# Patient Record
Sex: FEMALE | Race: BLACK OR AFRICAN AMERICAN | NOT HISPANIC OR LATINO | Employment: UNEMPLOYED | URBAN - METROPOLITAN AREA
[De-identification: names, ages, dates, MRNs, and addresses within clinical notes are randomized per-mention and may not be internally consistent; named-entity substitution may affect disease eponyms.]

---

## 2019-05-23 PROBLEM — R20.0 NUMBNESS AND TINGLING: Status: ACTIVE | Noted: 2019-05-23

## 2019-05-23 PROBLEM — R20.2 NUMBNESS AND TINGLING: Status: ACTIVE | Noted: 2019-05-23

## 2019-05-23 PROBLEM — M54.50 LOW BACK PAIN RADIATING TO RIGHT LEG: Status: ACTIVE | Noted: 2019-05-23

## 2019-05-23 PROBLEM — M79.604 LOW BACK PAIN RADIATING TO RIGHT LEG: Status: ACTIVE | Noted: 2019-05-23

## 2019-05-23 PROBLEM — M48.062 LUMBAR STENOSIS WITH NEUROGENIC CLAUDICATION: Status: ACTIVE | Noted: 2019-05-23

## 2019-05-23 PROBLEM — M51.36 DEGENERATIVE DISC DISEASE, LUMBAR: Status: ACTIVE | Noted: 2019-05-23

## 2019-05-23 PROBLEM — M51.369 DEGENERATIVE DISC DISEASE, LUMBAR: Status: ACTIVE | Noted: 2019-05-23

## 2020-01-22 PROBLEM — E78.5 HLD (HYPERLIPIDEMIA): Status: ACTIVE | Noted: 2020-01-22

## 2020-01-22 PROBLEM — K21.9 GASTROESOPHAGEAL REFLUX DISEASE WITHOUT ESOPHAGITIS: Status: ACTIVE | Noted: 2020-01-22

## 2020-01-22 PROBLEM — E11.9 TYPE 2 DIABETES MELLITUS, WITHOUT LONG-TERM CURRENT USE OF INSULIN: Status: ACTIVE | Noted: 2020-01-22

## 2020-01-22 PROBLEM — I10 ESSENTIAL HYPERTENSION: Status: ACTIVE | Noted: 2020-01-22

## 2020-11-17 PROBLEM — R10.9 ABDOMINAL PAIN: Status: ACTIVE | Noted: 2020-11-17

## 2020-12-26 PROBLEM — Z90.710 HISTORY OF HYSTERECTOMY: Status: ACTIVE | Noted: 2020-12-26

## 2020-12-26 PROBLEM — D75.839 THROMBOCYTOSIS: Status: ACTIVE | Noted: 2020-12-26

## 2021-02-22 PROBLEM — S82.841A CLOSED BIMALLEOLAR FRACTURE OF RIGHT ANKLE: Status: ACTIVE | Noted: 2021-02-22

## 2021-02-22 PROBLEM — M25.571 ACUTE RIGHT ANKLE PAIN: Status: ACTIVE | Noted: 2021-02-22
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
## 2021-02-22 PROBLEM — S90.529A: Status: ACTIVE | Noted: 2021-02-22

## 2021-03-03 PROBLEM — Z98.890 STATUS POST SURGERY: Status: ACTIVE | Noted: 2021-03-03

## 2021-03-03 PROBLEM — Z01.818 PRE-OP TESTING: Status: RESOLVED | Noted: 2021-03-03 | Resolved: 2021-03-03
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
## 2021-03-03 PROBLEM — R10.9 ABDOMINAL PAIN: Status: RESOLVED | Noted: 2020-11-17 | Resolved: 2021-03-03

## 2021-03-03 PROBLEM — M54.50 LOW BACK PAIN RADIATING TO RIGHT LEG: Chronic | Status: ACTIVE | Noted: 2019-05-23

## 2021-03-03 PROBLEM — Z90.710 HISTORY OF HYSTERECTOMY: Status: RESOLVED | Noted: 2020-12-26 | Resolved: 2021-03-03

## 2021-03-03 PROBLEM — Z01.818 PRE-OP TESTING: Status: ACTIVE | Noted: 2021-03-03

## 2021-03-03 PROBLEM — M79.604 LOW BACK PAIN RADIATING TO RIGHT LEG: Chronic | Status: ACTIVE | Noted: 2019-05-23

## 2021-05-12 ENCOUNTER — PATIENT MESSAGE (OUTPATIENT)
Dept: RESEARCH | Facility: HOSPITAL | Age: 56
End: 2021-05-12

## 2021-09-21 ENCOUNTER — PATIENT OUTREACH (OUTPATIENT)
Dept: ADMINISTRATIVE | Facility: HOSPITAL | Age: 56
End: 2021-09-21

## 2021-12-21 ENCOUNTER — PATIENT OUTREACH (OUTPATIENT)
Dept: ADMINISTRATIVE | Facility: HOSPITAL | Age: 56
End: 2021-12-21
Payer: MEDICAID

## 2022-08-15 PROBLEM — D75.1 POLYCYTHEMIA: Status: ACTIVE | Noted: 2022-08-15

## 2022-09-01 ENCOUNTER — PATIENT OUTREACH (OUTPATIENT)
Dept: EMERGENCY MEDICINE | Facility: OTHER | Age: 57
End: 2022-09-01
Payer: MEDICAID

## 2022-09-01 NOTE — PROGRESS NOTES
Spoke to patient today after recent ER visit due to Sciatica and she stated she was able to get her medications, but is still in a lot of pain. Patient's F/U with PCP is not until November to discuss with MD. I attempted rescheduling in the system, but cannot schedule for MD in [facility]. Sent patient contact info to get rescheduled for a sooner appointment, or to be added to the waitlist in the event someone cancels and she can get a sooner appointment. Instructed pt to call me if she has any issues at all rescheduling and she verbalized understanding. Pt. Stated she did receive resources sent via email last time we spoke. No additional needs at this time.

## 2022-09-08 DIAGNOSIS — U07.1 COVID-19 VIRUS DETECTED: ICD-10-CM

## 2022-09-10 ENCOUNTER — NURSE TRIAGE (OUTPATIENT)
Dept: ADMINISTRATIVE | Facility: CLINIC | Age: 57
End: 2022-09-10
Payer: MEDICAID

## 2022-09-10 NOTE — TELEPHONE ENCOUNTER
"[facility] call -       Pt c/o  hoarse, "tasting medicine", covid positive+, Pt said her nail beds are darker and she is having problems standing up at this point and sob. Covid protocol followed and pt advised to call 911 now. Alert and oriented, Pt agrees to and is able to follow up with calling 911 now. Invited Pt to call ooc at [phone] if she has any questions in the future but for the time being is to call 911 now for immediate medical attention. Encounter routed to PCP/staff as high priority.   Reason for Disposition   Shock suspected (e.g., cold/pale/clammy skin, too weak to stand, low BP, rapid pulse)    Additional Information   Negative: SEVERE difficulty breathing (e.g., struggling for each breath, speaks in single words)   Negative: Difficult to awaken or acting confused (e.g., disoriented, slurred speech)   Negative: Bluish (or gray) lips or face now    Protocols used: Coronavirus (COVID-19) Diagnosed or [id number]    "

## 2022-10-20 ENCOUNTER — PATIENT OUTREACH (OUTPATIENT)
Dept: EMERGENCY MEDICINE | Facility: OTHER | Age: 57
End: 2022-10-20
Payer: MEDICAID

## 2022-10-20 NOTE — PROGRESS NOTES
Spoke to patient and she stated she is doing well and had no additional needs at this time. Pt has a F/U with primary care next month. Instructed pt to call with any future needs/concerns and she verbalized understanding.

## 2022-11-04 PROBLEM — R35.0 FREQUENT URINATION: Status: ACTIVE | Noted: 2022-11-04

## 2022-11-04 PROBLEM — D47.3 ESSENTIAL THROMBOCYTHEMIA: Status: ACTIVE | Noted: 2022-11-04

## 2022-11-16 LAB
LEFT EYE DM RETINOPATHY: POSITIVE
RIGHT EYE DM RETINOPATHY: POSITIVE

## 2022-12-15 ENCOUNTER — PATIENT OUTREACH (OUTPATIENT)
Dept: ADMINISTRATIVE | Facility: HOSPITAL | Age: 57
End: 2022-12-15
Payer: MEDICAID

## 2023-06-06 PROBLEM — R06.02 SOB (SHORTNESS OF BREATH): Status: ACTIVE | Noted: 2023-06-06

## 2023-06-06 PROBLEM — R53.83 FATIGUE: Status: ACTIVE | Noted: 2023-06-06

## 2023-06-06 PROBLEM — R07.9 CHEST PAIN: Status: ACTIVE | Noted: 2023-06-06

## 2023-06-06 PROBLEM — M79.89 LEG SWELLING: Status: ACTIVE | Noted: 2023-06-06

## 2023-08-24 PROBLEM — R35.0 FREQUENT URINATION: Status: RESOLVED | Noted: 2022-11-04 | Resolved: 2023-08-24

## 2023-08-24 PROBLEM — R07.9 CHEST PAIN: Status: RESOLVED | Noted: 2023-06-06 | Resolved: 2023-08-24

## 2023-11-14 ENCOUNTER — PATIENT MESSAGE (OUTPATIENT)
Dept: ADMINISTRATIVE | Facility: HOSPITAL | Age: 58
End: 2023-11-14
Payer: MEDICAID

## 2024-02-20 ENCOUNTER — PATIENT MESSAGE (OUTPATIENT)
Dept: ADMINISTRATIVE | Facility: HOSPITAL | Age: 59
End: 2024-02-20
Payer: MEDICAID

## 2024-09-27 DIAGNOSIS — Z12.31 OTHER SCREENING MAMMOGRAM: ICD-10-CM

## 2024-09-30 ENCOUNTER — PATIENT MESSAGE (OUTPATIENT)
Dept: ADMINISTRATIVE | Facility: HOSPITAL | Age: 59
End: 2024-09-30
Payer: MEDICAID

## 2024-11-21 ENCOUNTER — PATIENT MESSAGE (OUTPATIENT)
Dept: ADMINISTRATIVE | Facility: HOSPITAL | Age: 59
End: 2024-11-21
Payer: MEDICAID

## 2025-01-17 PROBLEM — D50.9 IRON DEFICIENCY ANEMIA: Status: ACTIVE | Noted: 2025-01-17

## 2025-01-17 PROBLEM — D45 POLYCYTHEMIA VERA: Status: ACTIVE | Noted: 2025-01-17

## 2025-05-16 PROBLEM — N20.1 URETERAL STONE: Status: ACTIVE | Noted: 2025-05-16

## 2025-05-26 ENCOUNTER — PATIENT OUTREACH (OUTPATIENT)
Dept: ADMINISTRATIVE | Facility: HOSPITAL | Age: 60
End: 2025-05-26